# Patient Record
Sex: FEMALE
[De-identification: names, ages, dates, MRNs, and addresses within clinical notes are randomized per-mention and may not be internally consistent; named-entity substitution may affect disease eponyms.]

---

## 2020-03-05 ENCOUNTER — HOSPITAL ENCOUNTER (EMERGENCY)
Dept: HOSPITAL 92 - ERS | Age: 60
Discharge: HOME | End: 2020-03-05
Payer: MEDICAID

## 2020-03-05 DIAGNOSIS — Z79.899: ICD-10-CM

## 2020-03-05 DIAGNOSIS — S05.12XA: ICD-10-CM
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
## 2020-03-05 DIAGNOSIS — S80.211A: ICD-10-CM

## 2020-03-05 DIAGNOSIS — F32.9: ICD-10-CM

## 2020-03-05 DIAGNOSIS — I10: ICD-10-CM

## 2020-03-05 DIAGNOSIS — S01.112A: Primary | ICD-10-CM

## 2020-03-05 PROCEDURE — 70480 CT ORBIT/EAR/FOSSA W/O DYE: CPT

## 2020-03-05 PROCEDURE — 12011 RPR F/E/E/N/L/M 2.5 CM/<: CPT

## 2020-03-05 NOTE — CT
CT of theorbits:

3/5/2020



COMPARISON:None available



HISTORY:Fall, facial trauma



TECHNIQUE: Serial axial CT imaging at2.5 mm intervals through the orbits without contrast. Coronal an
d sagittal reformatted imaging obtained.



Findings:The frontal sinuses, visualized portions of the maxillary sinuses, sphenoid sinuses, ethmoid
 air cells, and imaged portions of the mastoid air cells appear well-aerated.



There is periorbital soft tissue swelling on the left. The globes demonstrate a normal symmetric size
, shape, and CT attenuation.



The nasal bones, the zygomatic arches, and the pterygoid plates are intact.



The orbital floor and the medial orbital wall is intact bilaterally. No acute fracture.



Impression:Periorbital soft tissue swelling on the left with no associated fracture



Reported By: Edvin Borrero 

Electronically Signed:  3/5/2020 3:43 PM